# Patient Record
Sex: FEMALE | Race: WHITE | NOT HISPANIC OR LATINO | Employment: STUDENT | URBAN - METROPOLITAN AREA
[De-identification: names, ages, dates, MRNs, and addresses within clinical notes are randomized per-mention and may not be internally consistent; named-entity substitution may affect disease eponyms.]

---
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
## 2021-03-05 ENCOUNTER — WEB ENCOUNTER (OUTPATIENT)
Dept: URBAN - METROPOLITAN AREA CLINIC 96 | Facility: CLINIC | Age: 21
End: 2021-03-05

## 2021-03-05 ENCOUNTER — OFFICE VISIT (OUTPATIENT)
Dept: URBAN - METROPOLITAN AREA CLINIC 96 | Facility: CLINIC | Age: 21
End: 2021-03-05
Payer: COMMERCIAL

## 2021-03-05 ENCOUNTER — OFFICE VISIT (OUTPATIENT)
Dept: URBAN - METROPOLITAN AREA CLINIC 98 | Facility: CLINIC | Age: 21
End: 2021-03-05

## 2021-03-05 ENCOUNTER — OFFICE VISIT (OUTPATIENT)
Dept: URBAN - METROPOLITAN AREA CLINIC 96 | Facility: CLINIC | Age: 21
End: 2021-03-05

## 2021-03-05 VITALS
BODY MASS INDEX: 27.16 KG/M2 | SYSTOLIC BLOOD PRESSURE: 107 MMHG | HEART RATE: 88 BPM | HEIGHT: 65 IN | DIASTOLIC BLOOD PRESSURE: 75 MMHG | WEIGHT: 163 LBS | TEMPERATURE: 98.2 F

## 2021-03-05 DIAGNOSIS — R10.84 GENERALIZED ABDOMINAL PAIN: ICD-10-CM

## 2021-03-05 DIAGNOSIS — R63.4 WEIGHT LOSS: ICD-10-CM

## 2021-03-05 DIAGNOSIS — R63.0 ANOREXIA: ICD-10-CM

## 2021-03-05 PROBLEM — 79890006: Status: ACTIVE | Noted: 2021-03-05

## 2021-03-05 PROCEDURE — 99244 OFF/OP CNSLTJ NEW/EST MOD 40: CPT | Performed by: INTERNAL MEDICINE

## 2021-03-05 RX ORDER — DICYCLOMINE HYDROCHLORIDE 10 MG/1
1 CAPSULE EVERY 8 HOURS FOR 5 DAYS, THEN EVERY 8 HRS AS NEEDED CAPSULE ORAL
Qty: 30 | Refills: 1 | OUTPATIENT
End: 2021-05-04

## 2021-03-05 RX ORDER — PANTOPRAZOLE SODIUM 40 MG/1
1 TABLET TABLET, DELAYED RELEASE ORAL ONCE A DAY
Qty: 30 | Refills: 1 | OUTPATIENT

## 2021-03-05 NOTE — HPI-TODAY'S VISIT:
The patient was referred by Dr. Person for GI distress.  A copy of this document is being forwarded to the referring provider.  20 y.o. WF, in school but also works at [employer] [place], lives w/ mom, has 1 brother Started 5 weeks ago Has bad stomach pain on L side Thought was period cramps Symptoms were so bad that went to [facility] and sent to ER for CT ER told all was good Pain has gotten worse Can't eat and lost weight Left work early last week b/c pain Went to [facility] - blood, U/S - told stomach virus No fevers No diarrhea No bowel habit changes

## 2021-03-09 ENCOUNTER — DASHBOARD ENCOUNTERS (OUTPATIENT)
Age: 21
End: 2021-03-09